# Patient Record
(demographics unavailable — no encounter records)

---

## 2024-12-24 NOTE — PHYSICAL EXAM
[de-identified] : General: patient is well developed, well nourished, in no acute distress, alert and oriented x 3. Mood and affect: normal Respiratory: no respiratory distress noted Skin: no scars over spine, skin intact, no erythema, increased warmth Alignment: The spine is well compensated in the coronal and sagittal plane. Gait: The patient is able to toe walk and heel walk without difficulty. The patient is able to tandem gait without difficulty. Palpation: no tenderness to palpation spine or paraspinal region Range of motion: Lumbar spine ROM is full Neurologic Exam: Motor: Manual Muscle testing in the lower extremities is 5 out of 5 in all muscle groups. There is no evidence of muscularatrophy in the lower extremities. Sensory: Sensation to light touch is grossly intact in the lower extremities Reflexes: DTR are present and symmetric throughout, no clonus, plantar responses are flexor Hip Exam: No pain with internal or external rotation of hips bilaterally Special tests: Straight leg raise test negative. Cross straight leg test negative. DUSTIN test negative Vascular: Examination of the peripheral vascular system demonstrates no evidence of congestion or edema. no evidence of lymphedema bilateral lower extremities, pulses are present and symmetric in both lower extremities. [de-identified] : XR full spine ap/lateral, lumbar flexion/extension 1/2/24 (my read): multilevel degenerative changes, no spondylolisthesis or dynamic instability, no scoliosis, no acute fractures  MRI lumbar spine without contrast 11/1/23 (my read): multilevel disc bulges and bilateral facet arthropathy with L4/L5 moderate bilateral lateral recess stenosis, left > right.  MRI 10/2024: L3/4 mod stenosis; L4/5 severe stenosis, canal and lateral recess with left lateral recess facet cyst, grade 1 spondylolisthesis.

## 2024-12-24 NOTE — HISTORY OF PRESENT ILLNESS
[de-identified] : Follow up: had JUDITH with dr. milton, initially quite effective.  now severe b/l LE radiculopathy, left greater than right.  Initial: This patient was referred by Dr. Cabezas. He reports a chronic history of low back pain going back at least several years. In the past, he was followed by an osteopathic physician and low back pain was well managed with conservative treatment measures including acupuncture. However, pain became severe in September of 2023. Pain primarily localized to his left lumbosacral region and radiates posteriorly down his left lower extremity to his foot. More recently, over the past several weeks, he's begun to have right lower extremity pain radiating down his right anterior lower leg from his knee into his foot. He has been in physical therapy without any significant relief of his symptoms.

## 2024-12-24 NOTE — DISCUSSION/SUMMARY
[de-identified] : L4/5 stenosis severe, facet cyst, lumbar radiculopathy; L3/4 moderate stenosis The patient is a candidate for L3/4 laminectomy, bilateral L4/5 hemilaminotomies with partial facetectomy/foraminotomy.  Further non operative treatment would include pain management.  Risks, benefits, alternatives were discussed with the patient at great length, including but not limited to, bleeding, infection, persistent neurologic deficit, worsening pain, worsening neurologic status, dural tear, medical complications (including but not limited to cardiac, pulmonary, renal), and the need for further surgery.  We discussed that there is no guarantee of return to pain free status or normal neurologic function.  The patient asked a number of cogent questions.  All questions were answered.  The patient demonstrated understanding of the risks, benefits, and alternatives.  The patient has elected to proceed with surgery.  may need instrumentation fusion in future if spondylolisthesis worsens or if cyst returns.

## 2025-05-20 NOTE — HISTORY OF PRESENT ILLNESS
[de-identified] : POA - Wound Check Visit [de-identified] : Patient Sukumar Betts 63-year-old Male presenting for post op- wound check visit. [de-identified] : 05/20/2025 - No new imaging

## 2025-05-27 NOTE — HISTORY OF PRESENT ILLNESS
[de-identified] : Post Op  [de-identified] : Patient Sukumar Betts 63-year-old Male presenting for post op- wound check visit. 13 days s/p Bilateral hemilaminectomies L4/5. Doing well post operatively.   [de-identified] : XR 05/27/2025 (my read):

## 2025-07-23 NOTE — HISTORY OF PRESENT ILLNESS
[de-identified] : Post Op Visit [de-identified] : Patient Sukumar Betts 63-year-old Male presenting for POA visit 70 days s/p bilateral hemilaminectomies L4/5 with bilateral partial facetectomy/ foraminotomies. Patient states prior to two weeks ago he was doing well with mild lumbar discomfort and prior radiculopathy has resolved with the surgery, however, on a recent trip to [place], he slipped but did not fall. He complained of instant back and neck discomfort. 1 week later after the slip, still in [place] he had worsened neck pain stroke -like symptoms on the left side.  Emergency room in [place], where they performed a head CT which came back normal (stated by the patient). Mr. Patino presents today with worsened bilateral lumbar pain associated with bilateral pain in the lower extremities below the knees. Denies numbness or tingling down the legs.  Patient denies current weakness on the left side, chest pain, HA or confusion.  [de-identified] : 5/5 L2-S1 b/l SILT L2-S1 b/l incision well healed, no erythema, no drainage, no warmth. [de-identified] : XR 07/23/2025 (my read): intact, no new injury or change [de-identified] : 70 days post op, doing well besides arthritic and muscle irritation brought on by sudden movement from slip [de-identified] : Ordered muscle relaxer Ordered meloxicam  f/u if new discomfort does not subside or worsens  f/u in 6-8 weeks if feeling better pt advised if feels chest pain, SOB or any weakness in face, or arms, to go to the ED immediately, and to follow up with their PCP.